# Patient Record
Sex: MALE | Employment: UNEMPLOYED | URBAN - METROPOLITAN AREA
[De-identification: names, ages, dates, MRNs, and addresses within clinical notes are randomized per-mention and may not be internally consistent; named-entity substitution may affect disease eponyms.]

---
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
## 2024-07-03 ENCOUNTER — OFFICE VISIT (OUTPATIENT)
Age: 24
End: 2024-07-03

## 2024-07-03 VITALS
BODY MASS INDEX: 17.44 KG/M2 | WEIGHT: 124.6 LBS | SYSTOLIC BLOOD PRESSURE: 112 MMHG | DIASTOLIC BLOOD PRESSURE: 68 MMHG | TEMPERATURE: 98.3 F | HEART RATE: 77 BPM | OXYGEN SATURATION: 97 % | HEIGHT: 71 IN

## 2024-07-03 DIAGNOSIS — H61.23 BILATERAL IMPACTED CERUMEN: Primary | ICD-10-CM

## 2024-07-03 NOTE — PROGRESS NOTES
Wilbur Bartholomew (:  2000) is a 23 y.o. male,New patient, here for evaluation of the following chief complaint(s):  Ear Fullness (Right ear fullness and pain for two weeks. )      ASSESSMENT/PLAN:  1. Bilateral impacted cerumen  -EAR IRRIGATION DONE BY MA.       Return if symptoms worsen or fail to improve.    SUBJECTIVE/OBJECTIVE:  PRESENT TO CLINIC AS RIGHT EAR FULLNESS AND DISCOMFORT FOR TWO WEEKS. NO DISCHARGE OR PAIN.      History provided by:  Patient  Ear Fullness   Associated symptoms include ear discharge.       Vitals:    24 1745   BP: 112/68   Site: Left Upper Arm   Position: Sitting   Cuff Size: Medium Adult   Pulse: 77   Temp: 98.3 °F (36.8 °C)   TempSrc: Oral   SpO2: 97%   Weight: 56.5 kg (124 lb 9.6 oz)   Height: 1.803 m (5' 11\")       Review of Systems   HENT:  Positive for ear discharge.        Physical Exam  Constitutional:       Appearance: Normal appearance.   HENT:      Head: Normocephalic and atraumatic.      Right Ear: Tympanic membrane, ear canal and external ear normal. There is impacted cerumen.      Left Ear: Tympanic membrane, ear canal and external ear normal. There is impacted cerumen.      Nose: Nose normal.      Mouth/Throat:      Mouth: Mucous membranes are moist.   Eyes:      Pupils: Pupils are equal, round, and reactive to light.   Pulmonary:      Effort: Pulmonary effort is normal.      Breath sounds: Normal breath sounds.   Musculoskeletal:         General: Normal range of motion.      Cervical back: Normal range of motion and neck supple.   Neurological:      Mental Status: He is alert and oriented to person, place, and time.   Psychiatric:         Mood and Affect: Mood normal.           An electronic signature was used to authenticate this note.    --Kelvin Botello DO